# Patient Record
(demographics unavailable — no encounter records)

---

## 2025-06-08 NOTE — ASSESSMENT
[FreeTextEntry1] : EXAM Right elbow with minimal ecchymosis, +ttp at radial head, -ttp at medial epicondyle, olecranon. Supination/pronation to 60/60. Elbow arc from 5 to 125. Able to make fist, oppose thumb to small finger and abduct fingers. Sensation intact throughout. <2sec cap refill. Healed laceration at posterior olecranon, no erythema.  Right elbow radiographs with nondisplaced radial neck fracture; concentric radiocapitellar and ulnohumeral joints. (3-view as viewed by me from outside facility)  ASSESSMENT/PLAN Right radial head fracture, minimally displaced - will manage with closed management [CPT 21885]  Reviewed radiographs with patient and discussed pathoanatomy. Discussed alignment is within acceptable parameters to manage with closed management. Discussed risk of stiffness, late tendon injury, and displacement requiring operative intervention. NWB, elevate, NSAIDs prn.  Discussed need to cease splinting and cease sling. Begin ROM. Will remain NWB through about 6 weeks post injury.  Acute complicated injury - risk of nonunion, malunion, post-traumatic arthrosis.  F/u 4weeks; repeat films.

## 2025-06-08 NOTE — HISTORY OF PRESENT ILLNESS
[de-identified] : Age: 29 year M PMHx: none Hand Dominance: RHD Chief Complaint: Right elbow pain s/p trauma 06/01/25. Patient reports that he had a mechanical fall where he landed directly onto the head of his right elbow, causing his injury. Patient was seen at [facility] 06/01/25 where he had radiographs performed that confirmed the fracture. Patient presents with his right arm in a sling. Denies numbness/tingling.  Trauma: 06/01/25 Outside Imaging/Treatment: radiographs from 06/01/25 OTC Medications: Advil PRN with minor relief OT/PT: none  Bracing: arm in sling Pain worse with: movement Pain better with: rest

## 2025-06-08 NOTE — HISTORY OF PRESENT ILLNESS
[de-identified] : Age: 29 year M PMHx: none Hand Dominance: RHD Chief Complaint: Right elbow pain s/p trauma 06/01/25. Patient reports that he had a mechanical fall where he landed directly onto the head of his right elbow, causing his injury. Patient was seen at [facility] 06/01/25 where he had radiographs performed that confirmed the fracture. Patient presents with his right arm in a sling. Denies numbness/tingling.  Trauma: 06/01/25 Outside Imaging/Treatment: radiographs from 06/01/25 OTC Medications: Advil PRN with minor relief OT/PT: none  Bracing: arm in sling Pain worse with: movement Pain better with: rest

## 2025-06-08 NOTE — HISTORY OF PRESENT ILLNESS
[de-identified] : Age: 29 year M PMHx: none Hand Dominance: RHD Chief Complaint: Right elbow pain s/p trauma 06/01/25. Patient reports that he had a mechanical fall where he landed directly onto the head of his right elbow, causing his injury. Patient was seen at [facility] 06/01/25 where he had radiographs performed that confirmed the fracture. Patient presents with his right arm in a sling. Denies numbness/tingling.  Trauma: 06/01/25 Outside Imaging/Treatment: radiographs from 06/01/25 OTC Medications: Advil PRN with minor relief OT/PT: none  Bracing: arm in sling Pain worse with: movement Pain better with: rest

## 2025-06-08 NOTE — ASSESSMENT
[FreeTextEntry1] : EXAM Right elbow with minimal ecchymosis, +ttp at radial head, -ttp at medial epicondyle, olecranon. Supination/pronation to 60/60. Elbow arc from 5 to 125. Able to make fist, oppose thumb to small finger and abduct fingers. Sensation intact throughout. <2sec cap refill. Healed laceration at posterior olecranon, no erythema.  Right elbow radiographs with nondisplaced radial neck fracture; concentric radiocapitellar and ulnohumeral joints. (3-view as viewed by me from outside facility)  ASSESSMENT/PLAN Right radial head fracture, minimally displaced - will manage with closed management [CPT 98264]  Reviewed radiographs with patient and discussed pathoanatomy. Discussed alignment is within acceptable parameters to manage with closed management. Discussed risk of stiffness, late tendon injury, and displacement requiring operative intervention. NWB, elevate, NSAIDs prn.  Discussed need to cease splinting and cease sling. Begin ROM. Will remain NWB through about 6 weeks post injury.  Acute complicated injury - risk of nonunion, malunion, post-traumatic arthrosis.  F/u 4weeks; repeat films.

## 2025-06-08 NOTE — ASSESSMENT
[FreeTextEntry1] : EXAM Right elbow with minimal ecchymosis, +ttp at radial head, -ttp at medial epicondyle, olecranon. Supination/pronation to 60/60. Elbow arc from 5 to 125. Able to make fist, oppose thumb to small finger and abduct fingers. Sensation intact throughout. <2sec cap refill. Healed laceration at posterior olecranon, no erythema.  Right elbow radiographs with nondisplaced radial neck fracture; concentric radiocapitellar and ulnohumeral joints. (3-view as viewed by me from outside facility)  ASSESSMENT/PLAN Right radial head fracture, minimally displaced - will manage with closed management [CPT 32683]  Reviewed radiographs with patient and discussed pathoanatomy. Discussed alignment is within acceptable parameters to manage with closed management. Discussed risk of stiffness, late tendon injury, and displacement requiring operative intervention. NWB, elevate, NSAIDs prn.  Discussed need to cease splinting and cease sling. Begin ROM. Will remain NWB through about 6 weeks post injury.  Acute complicated injury - risk of nonunion, malunion, post-traumatic arthrosis.  F/u 4weeks; repeat films.

## 2025-06-30 NOTE — HISTORY OF PRESENT ILLNESS
[de-identified] : Age: 29 year M PMHx: none Hand Dominance: RHD Chief Complaint: Right elbow pain s/p trauma 06/01/25. Patient reports that he had a mechanical fall where he landed directly onto the head of his right elbow, causing his injury. Patient was seen at [facility] 06/01/25 where he had radiographs performed that confirmed the fracture. Patient presents with his right arm in a sling. Denies numbness/tingling.  Trauma: 06/01/25 Outside Imaging/Treatment: radiographs from 06/01/25 OTC Medications: Advil PRN with minor relief OT/PT: none  Bracing: arm in sling Pain worse with: movement Pain better with: rest  06/30/2025: Patient is follow up on the right elbow, being treated for Right radial head fracture, minimally displaced. Patient reports that he has been avoiding strenuous activity up until this past week as he is moving and had to move boxes. Patient reports that he feels well at this time with minimal pain with activity. Patient denies use of medication at this time. 
unknown

## 2025-06-30 NOTE — ASSESSMENT
[FreeTextEntry1] : EXAM Right elbow with minimal ecchymosis, -ttp at radial head, -ttp at medial epicondyle, olecranon. Supination/pronation to 80/80. Elbow arc from 0 to 140. Able to make fist, oppose thumb to small finger and abduct fingers. Sensation intact throughout. <2sec cap refill. Healed laceration at posterior olecranon, no erythema.  Right elbow radiographs with nondisplaced radial neck fracture; concentric radiocapitellar and ulnohumeral joints. +Callus, alignment maintained. (3-view)  ASSESSMENT/PLAN Right radial head fracture, minimally displaced - will continue to manage with closed management [CPT 17368]  Reviewed radiographs with patient and discussed pathoanatomy. Discussed alignment is within acceptable parameters to manage with closed management. Discussed risk of stiffness, late tendon injury, and displacement requiring operative intervention. NWB, elevate, NSAIDs prn.  Discussed need to cease splinting and cease sling. Begin ROM. Will remain NWB through about 6 weeks post injury.  Acute complicated injury - risk of nonunion, malunion, post-traumatic arthrosis.  F/u 6weeks; repeat films.